# Patient Record
Sex: MALE | URBAN - METROPOLITAN AREA
[De-identification: names, ages, dates, MRNs, and addresses within clinical notes are randomized per-mention and may not be internally consistent; named-entity substitution may affect disease eponyms.]

---

## 2019-09-09 ENCOUNTER — OFFICE VISIT - RIVER FALLS (OUTPATIENT)
Dept: FAMILY MEDICINE | Facility: CLINIC | Age: 84
End: 2019-09-09

## 2022-02-16 NOTE — CARE COORDINATION
Patient:   CYNTHIA SANCHEZ            MRN: [id number]            FIN: [id number]               Age:   86 years     Sex:  Male     :  10/16/1932   Associated Diagnoses:   None   Author:   Ceci Blanco CMA      Care Coordination Hospital Discharge Note    Sources of Information:  [   ] Patient, family member, or caregiver (Please list): 19 LMTCB at 936am, 19 LMTCB at 0332pm  [  X ] Hospital discharge summary  [   ] Hospital fax  [   ] List of recent hospitalizations or ED visits  [   ] Other:   Last Attending:  Austin Leyva MD    Discharged From: [facility]  Admission Date: 19  Discharge Date: 19  Discharge Plan: Discharged to home  Diagnosis/Problem: Acute ITP    Medication Changes: [   ] Yes [  X ] No   Medication List Updated: [  X ] Yes [   ] No: Hospital Med List at Discharge Reconciled with Current Med List   Medications          *denotes recorded medication          *Probiotic Formula (Bacillus Coagulans) oral capsule: 1 cap(s), Oral, daily, 0 Refill(s).          *cholecalciferol 1000 intl units oral capsule: 1,000 International Unit, 1 cap(s), Oral, daily, 0 Refill(s).          *cyanocobalamin 1000 mcg oral tablet: 1,000 mcg, 1 tab(s), Oral, daily, 0 Refill(s).          *Xalatan 0.005% ophthalmic solution: 1 drop(s), Eye-Right, hs, 0 Refill(s).          *levobunolol 0.5% ophthalmic solution: 1 drop(s), Eye-Both, bid, 0 Refill(s).          *levothyroxine 75 mcg (0.075 mg) oral capsule: 75 mcg, 1 cap(s), Oral, daily, before breakfast, 0 Refill(s).          *lovastatin 40 mg oral tablet: 40 mg, 1 tab(s), Oral, hs, 0 Refill(s).          *olmesartan 20 mg oral tablet: 20 mg, 1 tab(s), Oral, daily, 0 Refill(s).          *pantoprazole 40 mg oral delayed release tablet: 40 mg, 1 tab(s), Oral, bid, before meals, 90 tab(s), 0 Refill(s).          *predniSONE 20 mg oral tablet: 40 mg, 2 tab(s), Oral, daily, for 5 day(s), 0 Refill(s).          *psyllium: 1 tab, Oral, hs, 0  Refill(s).      Needs Referral or Lab: [X   ] Yes [   ] No: CBC  Outpatient Provider Recommendations:  Establish care with Hematologist in [place].  Results of Bone Marrow Biopsy will be reviewed by our Hematologist and will be sent, along with records and recommendations to your new Hematologist.  Follow up labs to be done 09/09/19am at [place].  No Medications were changed.  CBC set up already at  Monday AM  Needs Follow-up Appointment:  [   ] Within 7 days of discharge (highly complex visit)  [ X  ] Within 14 days of discharge (moderately complex visit)    Appointment Made With:  JULIENNE   Date: 09/09/19    Additional Information Needed and Requested:  [   ] Yes:  [  X ] No